# Patient Record
Sex: FEMALE | Race: WHITE | NOT HISPANIC OR LATINO
[De-identification: names, ages, dates, MRNs, and addresses within clinical notes are randomized per-mention and may not be internally consistent; named-entity substitution may affect disease eponyms.]

---

## 2021-03-31 ENCOUNTER — TRANSCRIPTION ENCOUNTER (OUTPATIENT)
Age: 86
End: 2021-03-31

## 2022-08-16 DIAGNOSIS — M19.071 PRIMARY OSTEOARTHRITIS, RIGHT ANKLE AND FOOT: ICD-10-CM

## 2022-08-16 DIAGNOSIS — Z86.39 PERSONAL HISTORY OF OTHER ENDOCRINE, NUTRITIONAL AND METABOLIC DISEASE: ICD-10-CM

## 2022-08-16 DIAGNOSIS — M19.072 PRIMARY OSTEOARTHRITIS, LEFT ANKLE AND FOOT: ICD-10-CM

## 2022-08-16 DIAGNOSIS — H35.30 UNSPECIFIED MACULAR DEGENERATION: ICD-10-CM

## 2022-08-16 DIAGNOSIS — L85.1 ACQUIRED KERATOSIS [KERATODERMA] PALMARIS ET PLANTARIS: ICD-10-CM

## 2022-08-16 DIAGNOSIS — Z86.79 PERSONAL HISTORY OF OTHER DISEASES OF THE CIRCULATORY SYSTEM: ICD-10-CM

## 2022-08-16 DIAGNOSIS — Z98.890 OTHER SPECIFIED POSTPROCEDURAL STATES: ICD-10-CM

## 2022-08-16 RX ORDER — FUROSEMIDE 20 MG/1
20 TABLET ORAL
Refills: 0 | Status: ACTIVE | COMMUNITY

## 2022-08-16 RX ORDER — METOPROLOL TARTRATE 50 MG/1
50 TABLET, FILM COATED ORAL
Refills: 0 | Status: ACTIVE | COMMUNITY

## 2022-08-16 RX ORDER — CHONDROITIN SULFATE A 250 MG
250 CAPSULE ORAL
Refills: 0 | Status: ACTIVE | COMMUNITY

## 2022-08-16 RX ORDER — MULTIVIT,IRON,MINERALS/LUTEIN
TABLET ORAL
Refills: 0 | Status: ACTIVE | COMMUNITY

## 2022-08-30 ENCOUNTER — APPOINTMENT (OUTPATIENT)
Dept: PODIATRY | Facility: CLINIC | Age: 87
End: 2022-08-30

## 2022-08-30 VITALS — HEIGHT: 61 IN | WEIGHT: 185 LBS | BODY MASS INDEX: 34.93 KG/M2

## 2022-08-30 DIAGNOSIS — B35.3 TINEA PEDIS: ICD-10-CM

## 2022-08-30 PROCEDURE — 99212 OFFICE O/P EST SF 10 MIN: CPT

## 2022-08-31 PROBLEM — B35.3 ATHLETE'S FOOT: Status: ACTIVE | Noted: 2022-08-16

## 2022-09-08 NOTE — HISTORY OF PRESENT ILLNESS
[Other: ____] : [unfilled] [Cane] : a cane [FreeTextEntry1] : Patient presents today because of athlete's foot on the right side. She has fungus nails and it spreads onto the skin. She has not been using the prescription cream I wrote for.\par

## 2022-09-08 NOTE — ASSESSMENT
[FreeTextEntry1] : \par Treatment:  She still has the Lotrisone. I want her to use it daily for one month after bathing on the entire right foot and on the nails and then once a week prophylactically thereafter. I want her to sleep without socks on. She could use antifungal powder. She will follow-up in the office for evaluation only as needed.

## 2022-09-08 NOTE — PHYSICAL EXAM
[0] : left foot dorsalis pedis 0 [Vibration Dec.] : diminished vibratory sensation at the level of the toes [Position Sense Dec.] : diminished position sense at the level of the toes [Diminished Throughout Right Foot] : diminished sensation with monofilament testing throughout right foot [Diminished Throughout Left Foot] : diminished sensation with monofilament testing throughout left foot [de-identified] : Arthritic hammertoes. Flatfoot. Bad case of athlete's foot on the right side.  [FreeTextEntry1] : She has athlete's foot of the right foot at the interspace 3 and 4, entire plantar aspect of the right foot. She has onychomycosis in all nails of the right foot 1, 2, 3, 4 and 5. They are end-stage thick, chalky, brittle with subungual debris and mycosis.

## 2022-11-17 ENCOUNTER — APPOINTMENT (OUTPATIENT)
Dept: PODIATRY | Facility: CLINIC | Age: 87
End: 2022-11-17

## 2022-11-17 PROCEDURE — 11720 DEBRIDE NAIL 1-5: CPT | Mod: Q8,59

## 2022-11-17 PROCEDURE — 11055 PARING/CUTG B9 HYPRKER LES 1: CPT | Mod: Q8

## 2022-11-22 NOTE — PHYSICAL EXAM
[0] : left foot dorsalis pedis 0 [Vibration Dec.] : diminished vibratory sensation at the level of the toes [Position Sense Dec.] : diminished position sense at the level of the toes [Diminished Throughout Right Foot] : diminished sensation with monofilament testing throughout right foot [Diminished Throughout Left Foot] : diminished sensation with monofilament testing throughout left foot [FreeTextEntry3] : Thin, atrophic skin. Absent hair growth. The patient has a class finding of Q8-Two Class B findings.  [de-identified] : Arthritic hammertoes. Flatfoot.  [FreeTextEntry1] : She has onychomycosis in all nails of the right foot 1, 2, 3, 4 and 5. They are end-stage thick, chalky, brittle with subungual debris and mycosis. The hallux nail is painful at the top, tip and tibial border, 2, 3 and 4 are painful at the tip and the 5th is painful at the top and tip. Right 3rd toe distal keratotic lesion, preulcerative.

## 2022-11-22 NOTE — ASSESSMENT
[FreeTextEntry1] : \par Treatment: I trimmed the right 3rd toe keratosis. I manually and mechanically debrided mycotic nails 1 through 5 on the right foot using a small straight nail splitter and rotary db. The nails were aggressively debrided and debulked to make them comfortable in shoe gear. I want her to soak with warm water and Epsom salt through the winter months and moisturize. Shoes are orthopedic and adequate.

## 2022-11-22 NOTE — HISTORY OF PRESENT ILLNESS
[Sneakers] : gray [FreeTextEntry1] : Patient presents today for routine foot care. She complains of painful onychomycotic nails that she cannot care for herself. She has a high risk foot due to poor circulation.

## 2023-01-26 ENCOUNTER — APPOINTMENT (OUTPATIENT)
Dept: PODIATRY | Facility: CLINIC | Age: 88
End: 2023-01-26
Payer: MEDICARE

## 2023-01-26 PROCEDURE — 11720 DEBRIDE NAIL 1-5: CPT

## 2023-02-01 NOTE — PHYSICAL EXAM
[0] : left foot dorsalis pedis 0 [Vibration Dec.] : diminished vibratory sensation at the level of the toes [Position Sense Dec.] : diminished position sense at the level of the toes [Diminished Throughout Right Foot] : diminished sensation with monofilament testing throughout right foot [Diminished Throughout Left Foot] : diminished sensation with monofilament testing throughout left foot [FreeTextEntry3] : Thin, atrophic skin. Absent hair growth. The patient has a class finding of Q8-Two Class B findings.  [FreeTextEntry1] : She has onychomycosis in all nails of the right foot 1, 2, 3, 4 and 5. They are end-stage thick, chalky, brittle with subungual debris and mycosis. The hallux nail is painful at the top, tip and tibial border, 2, 3 and 4 are painful at the tip and the 5th is painful at the top and tip. Right 3rd toe distal keratotic lesion, preulcerative. [de-identified] : Arthritic hammertoes. Flatfoot.

## 2023-02-01 NOTE — ASSESSMENT
[FreeTextEntry1] : \par Impression: Onychomycosis. Pain in toes.\par \par Treatment: I aggressively debrided and debulked painful mycotic nails using a small straight nail splitter and rotary db. I applied Naftin gel. I gave the patient samples to use at home. Follow-up for routine foot care.

## 2023-02-01 NOTE — HISTORY OF PRESENT ILLNESS
[Other: ____] : [unfilled] [FreeTextEntry1] : Patient is here for routine foot care. She complains of painful onychomycotic nails that she cannot care for herself. She has a high risk foot due to poor circulation. She is under the care of Dr. Campos who she last saw 1 month ago.\par \par

## 2023-04-14 ENCOUNTER — APPOINTMENT (OUTPATIENT)
Dept: PODIATRY | Facility: CLINIC | Age: 88
End: 2023-04-14
Payer: MEDICARE

## 2023-04-14 PROCEDURE — 11721 DEBRIDE NAIL 6 OR MORE: CPT

## 2023-04-20 NOTE — PHYSICAL EXAM
[0] : left foot dorsalis pedis 0 [Vibration Dec.] : diminished vibratory sensation at the level of the toes [Position Sense Dec.] : diminished position sense at the level of the toes [Diminished Throughout Right Foot] : diminished sensation with monofilament testing throughout right foot [Diminished Throughout Left Foot] : diminished sensation with monofilament testing throughout left foot [de-identified] : Arthritic hammertoe.  Flatfoot. [FreeTextEntry1] : Q8 - The patient has class findings of Q8 - Two Class B findings.

## 2023-04-20 NOTE — ASSESSMENT
[FreeTextEntry1] : Impression: Onychomycosis. Pain in toes.\par \par Treatment: I aggressively debrided and debulked painful mycotic nails using a small straight nail splitter and rotary db. I applied Naftin gel to treat the fungal infection.  I gave the patient samples to use at home. Follow-up for routine foot care. \par

## 2023-04-20 NOTE — HISTORY OF PRESENT ILLNESS
[Sneakers] : gray [Cane] : a cane [FreeTextEntry1] : Patient presents today for care of painful, mycotic nails that are getting worse.  She is unable to care for them due to the thickness of the nails.  She is at high risk due to poor circulation.  Patient is under the care of Dr. Campos who she last saw one week ago.

## 2023-06-28 ENCOUNTER — APPOINTMENT (OUTPATIENT)
Dept: PODIATRY | Facility: CLINIC | Age: 88
End: 2023-06-28
Payer: MEDICARE

## 2023-06-28 DIAGNOSIS — M79.673 PAIN IN UNSPECIFIED FOOT: ICD-10-CM

## 2023-06-28 DIAGNOSIS — R23.4 CHANGES IN SKIN TEXTURE: ICD-10-CM

## 2023-06-28 DIAGNOSIS — L85.3 XEROSIS CUTIS: ICD-10-CM

## 2023-06-28 PROCEDURE — 99212 OFFICE O/P EST SF 10 MIN: CPT

## 2023-06-28 RX ORDER — HYDROCORTISONE 1 %
12 CREAM (GRAM) TOPICAL
Qty: 225 | Refills: 2 | Status: ACTIVE | COMMUNITY
Start: 2023-06-28 | End: 1900-01-01

## 2023-06-30 PROBLEM — R23.4 SKIN FISSURE: Status: ACTIVE | Noted: 2023-06-29

## 2023-06-30 PROBLEM — L85.3 XEROSIS CUTIS: Status: ACTIVE | Noted: 2023-06-29

## 2023-06-30 PROBLEM — M79.673 PAIN, FOOT: Status: ACTIVE | Noted: 2023-06-29

## 2023-06-30 NOTE — HISTORY OF PRESENT ILLNESS
[Other: ____] : [unfilled] [FreeTextEntry1] : Patient presents today because of a bad case of athlete's foot but she has xerosis and dry skin.  She has fissures that are cracked open at the ball of the foot, right side worse than left.\par

## 2023-06-30 NOTE — ASSESSMENT
[FreeTextEntry1] : \par Impression: Xerosis. Skin fissure. Pain. \par \par Treatment: I debrided the fissures. I applied Neosporin. She could soak with warm water and white vinegar. I ePrescribed Ammonium Lactate for the patient to use daily for the next 3 months. Call the office with any worsening or issues.

## 2023-09-06 ENCOUNTER — APPOINTMENT (OUTPATIENT)
Dept: PODIATRY | Facility: CLINIC | Age: 88
End: 2023-09-06

## 2023-09-27 ENCOUNTER — APPOINTMENT (OUTPATIENT)
Dept: PODIATRY | Facility: CLINIC | Age: 88
End: 2023-09-27
Payer: MEDICARE

## 2023-09-27 PROCEDURE — 11720 DEBRIDE NAIL 1-5: CPT

## 2023-12-21 ENCOUNTER — APPOINTMENT (OUTPATIENT)
Dept: PODIATRY | Facility: CLINIC | Age: 88
End: 2023-12-21
Payer: MEDICARE

## 2023-12-21 PROCEDURE — 11720 DEBRIDE NAIL 1-5: CPT

## 2024-01-02 NOTE — PHYSICAL EXAM
[0] : left foot dorsalis pedis 0 [Vibration Dec.] : diminished vibratory sensation at the level of the toes [Position Sense Dec.] : diminished position sense at the level of the toes [Diminished Throughout Right Foot] : diminished sensation with monofilament testing throughout right foot [Diminished Throughout Left Foot] : diminished sensation with monofilament testing throughout left foot [FreeTextEntry3] : Thin, atrophic skin. Absent hair growth. The patient has a class finding of Q8-Two Class B findings.  [FreeTextEntry1] : She has onychomycosis in all nails of the right foot 1, 2, 3, 4 and 5. They are end-stage thick, chalky, brittle with subungual debris and mycosis. The are painful on the tops and tips. The hallux nail is also painful at the tibial border.

## 2024-01-02 NOTE — HISTORY OF PRESENT ILLNESS
[FreeTextEntry1] : Patient presents today with painful, end-stage, deformed onychomycotic nails that are getting much worse. They are painful 1, 2, 3, 4 and 5 on the right foot. She cannot care for them herself. She has a high-risk foot due to poor circulation. The patient's history and physical has been reviewed and verified with no changes.

## 2024-01-02 NOTE — ASSESSMENT
[FreeTextEntry1] : Impression: Onychomycosis.  Pain.    Treatment: I manually and mechanically debrided mycotic nails x5 using a small straight nail splitter and rotary db. The nails were aggressively debrided and debulked to make them comfortable in shoe gear. This patient has a high-risk foot due to poor circulation.  I applied Naftin gel and gave her samples. Discussed appropriate shoe gear. Follow-up as needed.

## 2024-02-29 ENCOUNTER — APPOINTMENT (OUTPATIENT)
Dept: PODIATRY | Facility: CLINIC | Age: 89
End: 2024-02-29
Payer: MEDICARE

## 2024-02-29 PROCEDURE — 11720 DEBRIDE NAIL 1-5: CPT

## 2024-03-04 NOTE — PHYSICAL EXAM
[0] : left foot dorsalis pedis 0 [Vibration Dec.] : diminished vibratory sensation at the level of the toes [Position Sense Dec.] : diminished position sense at the level of the toes [Diminished Throughout Right Foot] : diminished sensation with monofilament testing throughout right foot [Diminished Throughout Left Foot] : diminished sensation with monofilament testing throughout left foot [FreeTextEntry3] : Thin, atrophic skin. Absent hair growth. The patient has a class finding of Q8-Two Class B findings.  [de-identified] : Arthritic hammertoes. Flatfoot.  [FreeTextEntry1] : She has onychomycosis in all nails of the right foot 1, 2, 3, 4 and 5. They are end-stage thick, chalky, brittle with subungual debris and mycosis. The are painful on the tops and tips. The hallux nail is also painful at the tibial border.

## 2024-03-04 NOTE — HISTORY OF PRESENT ILLNESS
[FreeTextEntry1] : Patient presents today with painful worsening onychomycotic nails that she cannot care for herself. They cause her pain and difficulty in shoe gear. The patient's history and physical has been reviewed and verified with no changes.

## 2024-03-04 NOTE — ASSESSMENT
[FreeTextEntry1] : Impression: Onychomycosis.  Pain.    Treatment: I manually and mechanically debrided mycotic nails x5 using a small straight nail splitter and rotary db. The nails were aggressively debrided and debulked to make them comfortable in shoe gear. This patient has a high-risk foot due to poor circulation.  I applied Naftin gel.  Discussed appropriate shoe gear. Follow-up as needed.

## 2024-05-09 ENCOUNTER — APPOINTMENT (OUTPATIENT)
Dept: PODIATRY | Facility: CLINIC | Age: 89
End: 2024-05-09
Payer: MEDICARE

## 2024-05-09 DIAGNOSIS — M79.674 PAIN IN RIGHT TOE(S): ICD-10-CM

## 2024-05-09 DIAGNOSIS — I70.91 GENERALIZED ATHEROSCLEROSIS: ICD-10-CM

## 2024-05-09 DIAGNOSIS — B35.1 TINEA UNGUIUM: ICD-10-CM

## 2024-05-09 DIAGNOSIS — L85.1 ACQUIRED KERATOSIS [KERATODERMA] PALMARIS ET PLANTARIS: ICD-10-CM

## 2024-05-09 PROCEDURE — 11055 PARING/CUTG B9 HYPRKER LES 1: CPT | Mod: Q8

## 2024-05-09 PROCEDURE — 11720 DEBRIDE NAIL 1-5: CPT | Mod: Q8,59

## 2024-05-10 PROBLEM — M79.674 PAIN OF TOE OF RIGHT FOOT: Status: ACTIVE | Noted: 2022-11-21

## 2024-05-10 PROBLEM — L85.1 KERATODERMA, ACQUIRED: Status: ACTIVE | Noted: 2022-11-21

## 2024-05-10 PROBLEM — I70.91 GENERALIZED ATHEROSCLEROSIS: Status: ACTIVE | Noted: 2022-08-16

## 2024-05-10 PROBLEM — B35.1 ONYCHOMYCOSIS: Status: ACTIVE | Noted: 2022-08-16

## 2024-05-13 NOTE — PHYSICAL EXAM
[0] : left foot dorsalis pedis 0 [Vibration Dec.] : diminished vibratory sensation at the level of the toes [Position Sense Dec.] : diminished position sense at the level of the toes [Diminished Throughout Right Foot] : diminished sensation with monofilament testing throughout right foot [Diminished Throughout Left Foot] : diminished sensation with monofilament testing throughout left foot [FreeTextEntry3] : Thin, atrophic skin. Absent hair growth. The patient has a class finding of Q8-Two Class B findings.  [de-identified] : Arthritic hammertoes. Flatfoot.  [FreeTextEntry1] : She has onychomycosis in all nails of the right foot 1, 2, 3, 4 and 5. They are end-stage thick, chalky, brittle with subungual debris and mycosis. The are painful on the tops and tips. There is a painful right sub 5 IPK.

## 2024-05-13 NOTE — HISTORY OF PRESENT ILLNESS
[FreeTextEntry1] : Patient presents today complaining of painful, thickened onychomycotic nails that she is unable to care for herself. They are painful.  The patient's history and physical has been reviewed and verified with no changes.

## 2024-05-13 NOTE — ASSESSMENT
[FreeTextEntry1] : Impression: Onychomycosis.  Pain.  IPK. Generalized atherosclerosis.  Treatment: I manually and mechanically debrided mycotic nails x5 using a small straight nail splitter and rotary db. The nails were aggressively debrided and debulked to make them comfortable in shoe gear. This patient has a high-risk foot due to poor circulation. the IPK was trimmed and enucleated, after prepping with alcohol. I put an aperture pad on.  I applied Naftin gel to the nails and lotion to the remainder of the foot.  Discussed appropriate shoe gear. I encouraged her to walk for her circulation. Follow-up in 3 months.

## 2024-07-18 ENCOUNTER — APPOINTMENT (OUTPATIENT)
Dept: PODIATRY | Facility: CLINIC | Age: 89
End: 2024-07-18

## 2024-07-18 DIAGNOSIS — B35.1 TINEA UNGUIUM: ICD-10-CM

## 2024-07-18 DIAGNOSIS — M79.674 PAIN IN RIGHT TOE(S): ICD-10-CM

## 2024-07-18 PROCEDURE — 11720 DEBRIDE NAIL 1-5: CPT

## 2024-07-29 ENCOUNTER — APPOINTMENT (OUTPATIENT)
Dept: PULMONOLOGY | Facility: CLINIC | Age: 89
End: 2024-07-29
Payer: MEDICARE

## 2024-07-29 VITALS
TEMPERATURE: 97.5 F | SYSTOLIC BLOOD PRESSURE: 144 MMHG | RESPIRATION RATE: 18 BRPM | BODY MASS INDEX: 35.12 KG/M2 | DIASTOLIC BLOOD PRESSURE: 92 MMHG | WEIGHT: 186 LBS | HEART RATE: 82 BPM | OXYGEN SATURATION: 97 % | HEIGHT: 61 IN

## 2024-07-29 DIAGNOSIS — Z86.79 PERSONAL HISTORY OF OTHER DISEASES OF THE CIRCULATORY SYSTEM: ICD-10-CM

## 2024-07-29 DIAGNOSIS — R06.2 WHEEZING: ICD-10-CM

## 2024-07-29 PROCEDURE — 99204 OFFICE O/P NEW MOD 45 MIN: CPT

## 2024-07-29 RX ORDER — APIXABAN 5 MG/1
5 TABLET, FILM COATED ORAL
Refills: 0 | Status: ACTIVE | COMMUNITY

## 2024-07-29 RX ORDER — SPIRONOLACTONE 25 MG/1
25 TABLET ORAL
Refills: 0 | Status: ACTIVE | COMMUNITY

## 2024-07-29 RX ORDER — EMPAGLIFLOZIN 10 MG/1
10 TABLET, FILM COATED ORAL
Refills: 0 | Status: ACTIVE | COMMUNITY

## 2024-07-29 RX ORDER — METOPROLOL SUCCINATE 50 MG/1
50 TABLET, EXTENDED RELEASE ORAL
Refills: 0 | Status: ACTIVE | COMMUNITY

## 2024-07-29 NOTE — DISCUSSION/SUMMARY
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

## 2024-07-29 NOTE — PROCEDURE
[FreeTextEntry1] :  CXR 7/26/24: Mild pulmonary vascular congestion.  Blunting of the right costophrenic angle. slow gait, seems slightly more unstable on left

## 2024-07-29 NOTE — REVIEW OF SYSTEMS
[Nasal Congestion] : nasal congestion [Cough] : cough [Wheezing] : wheezing [SOB on Exertion] : sob on exertion [Edema] : edema [Fatigue] : no fatigue [Sinus Problems] : no sinus problems [Chest Tightness] : no chest tightness [Sputum] : no sputum [GERD] : no gerd [Myalgias] : no myalgias [Rash] : no rash [Anxiety] : no anxiety

## 2024-07-29 NOTE — HISTORY OF PRESENT ILLNESS
[Former] : former [TextBox_4] : She is an 88-year-old woman.  She has been noting shortness of breath for the past several months.  She was evaluated in urgent care and was given antibiotics.  Her shortness of breath continued.  There also has been some intermittent wheezing. She saw her primary care provider and was found to have atrial fibrillation.  After that she went to see cardiology and was diagnosed with CHF as well as the atrial fibrillation.  She was placed on a diuretic and she is feeling better.  She denied any prior history of pulmonary disease.  She stopped smoking over 20 years ago. [YearQuit] : 2000 [Difficulty Maintaining Sleep] : does not have difficulty maintaining sleep

## 2024-07-29 NOTE — PHYSICAL EXAM
[No Acute Distress] : no acute distress [Normal Appearance] : normal appearance [Normal S1, S2] : normal s1, s2 [Irregular rate/rhythm] : irregular rate/rhythm [No Resp Distress] : no resp distress [Clear to Auscultation Bilaterally] : clear to auscultation bilaterally [No HSM] : no hsm [Normal Gait] : normal gait [No Clubbing] : no clubbing [No Cyanosis] : no cyanosis [No Edema] : no edema [Normal Turgor] : normal turgor [No Focal Deficits] : no focal deficits [No Motor Deficits] : no motor deficits [Oriented x3] : oriented x3 [Normal Affect] : normal affect [TextBox_54] : The heart sounds were irregular

## 2024-08-28 ENCOUNTER — APPOINTMENT (OUTPATIENT)
Dept: PULMONOLOGY | Facility: CLINIC | Age: 89
End: 2024-08-28
Payer: MEDICARE

## 2024-08-28 VITALS
TEMPERATURE: 96.2 F | WEIGHT: 176 LBS | DIASTOLIC BLOOD PRESSURE: 62 MMHG | OXYGEN SATURATION: 98 % | HEART RATE: 54 BPM | SYSTOLIC BLOOD PRESSURE: 148 MMHG | BODY MASS INDEX: 33.26 KG/M2

## 2024-08-28 DIAGNOSIS — R06.2 WHEEZING: ICD-10-CM

## 2024-08-28 PROCEDURE — 99214 OFFICE O/P EST MOD 30 MIN: CPT

## 2024-08-28 NOTE — PROCEDURE
[FreeTextEntry1] : CXR 7/26/24: Mild pulmonary vascular congestion.  Blunting of the right costophrenic angle.

## 2024-08-28 NOTE — REVIEW OF SYSTEMS
[Nasal Congestion] : nasal congestion [SOB on Exertion] : sob on exertion [Edema] : edema [Fatigue] : no fatigue [Sinus Problems] : no sinus problems [Cough] : no cough [Chest Tightness] : no chest tightness [Wheezing] : no wheezing [Chest Discomfort] : no chest discomfort [Palpitations] : no palpitations [GERD] : no gerd [Myalgias] : no myalgias [Rash] : no rash [Anxiety] : no anxiety

## 2024-08-28 NOTE — DISCUSSION/SUMMARY
[FreeTextEntry1] : Impression Wheezing has resolved -Most likely this was due to "cardiac asthma" as noted on chest radiograph No prior history of pulmonary disease Former smoker stopped in 2000  Recommend Continue with her cardiac regimen Diuretics as per cardiology PFT when feasible I will see her again as needed

## 2024-08-28 NOTE — HISTORY OF PRESENT ILLNESS
[Former] : former [TextBox_4] : She is an 88-year-old woman.  She has been noting shortness of breath for the past several months.  She was evaluated in urgent care and was given antibiotics.  Her shortness of breath continued.  There also has been some intermittent wheezing. She saw her primary care provider and was found to have atrial fibrillation.  After that she went to see cardiology and was diagnosed with CHF as well as the atrial fibrillation.  She was placed on a diuretic and she is feeling better.  She denied any prior history of pulmonary disease.  She stopped smoking over 20 years ago.  Since her last visit her shortness of breath has improved.  She has not been wheezing.  Her diuretic was changed.  She does not know which 1 she is on now.  She had a stress test and was told everything was okay. [YearQuit] : 2000 [Difficulty Maintaining Sleep] : does not have difficulty maintaining sleep

## 2024-08-28 NOTE — PHYSICAL EXAM
[No Acute Distress] : no acute distress [Normal Appearance] : normal appearance [Normal S1, S2] : normal s1, s2 [No Resp Distress] : no resp distress [Clear to Auscultation Bilaterally] : clear to auscultation bilaterally [No HSM] : no hsm [Normal Gait] : normal gait [No Clubbing] : no clubbing [No Cyanosis] : no cyanosis [No Edema] : no edema [Normal Turgor] : normal turgor [No Focal Deficits] : no focal deficits [No Motor Deficits] : no motor deficits [Oriented x3] : oriented x3 [Normal Affect] : normal affect

## 2024-09-25 ENCOUNTER — APPOINTMENT (OUTPATIENT)
Dept: PODIATRY | Facility: CLINIC | Age: 89
End: 2024-09-25

## 2024-09-25 DIAGNOSIS — B35.1 TINEA UNGUIUM: ICD-10-CM

## 2024-09-25 DIAGNOSIS — M79.674 PAIN IN RIGHT TOE(S): ICD-10-CM

## 2024-09-25 PROCEDURE — 11720 DEBRIDE NAIL 1-5: CPT

## 2024-10-01 RX ORDER — ALENDRONATE SODIUM 35 MG/1
TABLET ORAL
Refills: 0 | Status: ACTIVE | COMMUNITY

## 2024-10-02 NOTE — PHYSICAL EXAM
[0] : left foot dorsalis pedis 0 [Vibration Dec.] : diminished vibratory sensation at the level of the toes [Position Sense Dec.] : diminished position sense at the level of the toes [Diminished Throughout Right Foot] : diminished sensation with monofilament testing throughout right foot [Diminished Throughout Left Foot] : diminished sensation with monofilament testing throughout left foot [FreeTextEntry3] : Thin, atrophic skin. Absent hair growth. The patient has a class finding of Q8-Two Class B findings.  [de-identified] : Arthritic hammertoes. Flatfoot.  [FreeTextEntry1] : She has onychomycosis in all nails of the right foot 1, 2, 3, 4 and 5. They are end-stage thick, chalky, brittle with subungual debris and mycosis. The are painful on the tops and tips.

## 2024-10-02 NOTE — HISTORY OF PRESENT ILLNESS
[FreeTextEntry1] : Patient presents today complaining of painful, thickened mycotic nails that she cannot care for herself. They cause her pain and difficulty in shoe gear. The patient's history and physical has been reviewed and verified with no changes.

## 2024-10-02 NOTE — PHYSICAL EXAM
[0] : left foot dorsalis pedis 0 [Vibration Dec.] : diminished vibratory sensation at the level of the toes [Position Sense Dec.] : diminished position sense at the level of the toes [Diminished Throughout Right Foot] : diminished sensation with monofilament testing throughout right foot [Diminished Throughout Left Foot] : diminished sensation with monofilament testing throughout left foot [FreeTextEntry3] : Thin, atrophic skin. Absent hair growth. The patient has a class finding of Q8-Two Class B findings.  [de-identified] : Arthritic hammertoes. Flatfoot.  [FreeTextEntry1] : She has onychomycosis in all nails of the right foot 1, 2, 3, 4 and 5. They are end-stage thick, chalky, brittle with subungual debris and mycosis. The are painful on the tops and tips.

## 2024-10-02 NOTE — ASSESSMENT
[FreeTextEntry1] : Impression: Onychomycosis.  Pain.   Treatment: I manually and mechanically debrided mycotic nails x5 using a small straight nail splitter and rotary db. The nails were aggressively debrided and debulked to make them comfortable in shoe gear. This patient has a high-risk foot due to poor circulation.   I applied Naftin gel to the nails and lotion to the remainder of the foot.  Discussed appropriate shoe gear. I encouraged her to walk for her circulation. Follow-up in 3 months.

## 2024-12-18 ENCOUNTER — APPOINTMENT (OUTPATIENT)
Dept: PODIATRY | Facility: CLINIC | Age: 88
End: 2024-12-18

## 2024-12-18 DIAGNOSIS — M79.673 PAIN IN UNSPECIFIED FOOT: ICD-10-CM

## 2024-12-18 DIAGNOSIS — B35.3 TINEA PEDIS: ICD-10-CM

## 2024-12-18 PROCEDURE — 99212 OFFICE O/P EST SF 10 MIN: CPT

## 2025-02-27 ENCOUNTER — APPOINTMENT (OUTPATIENT)
Dept: PODIATRY | Facility: CLINIC | Age: 89
End: 2025-02-27

## 2025-02-27 DIAGNOSIS — I70.91 GENERALIZED ATHEROSCLEROSIS: ICD-10-CM

## 2025-02-27 DIAGNOSIS — L85.1 ACQUIRED KERATOSIS [KERATODERMA] PALMARIS ET PLANTARIS: ICD-10-CM

## 2025-02-27 DIAGNOSIS — M79.674 PAIN IN RIGHT TOE(S): ICD-10-CM

## 2025-02-27 DIAGNOSIS — B35.1 TINEA UNGUIUM: ICD-10-CM

## 2025-02-27 PROCEDURE — 11720 DEBRIDE NAIL 1-5: CPT | Mod: Q8,59

## 2025-02-27 PROCEDURE — 11055 PARING/CUTG B9 HYPRKER LES 1: CPT | Mod: Q8

## 2025-05-09 ENCOUNTER — APPOINTMENT (OUTPATIENT)
Dept: PODIATRY | Facility: CLINIC | Age: 89
End: 2025-05-09

## 2025-05-09 DIAGNOSIS — B35.1 TINEA UNGUIUM: ICD-10-CM

## 2025-05-09 DIAGNOSIS — L85.1 ACQUIRED KERATOSIS [KERATODERMA] PALMARIS ET PLANTARIS: ICD-10-CM

## 2025-05-09 DIAGNOSIS — I70.91 GENERALIZED ATHEROSCLEROSIS: ICD-10-CM

## 2025-05-09 DIAGNOSIS — M79.674 PAIN IN RIGHT TOE(S): ICD-10-CM

## 2025-05-09 PROCEDURE — 11720 DEBRIDE NAIL 1-5: CPT | Mod: Q8,59

## 2025-05-09 PROCEDURE — 11055 PARING/CUTG B9 HYPRKER LES 1: CPT | Mod: Q8

## 2025-07-17 ENCOUNTER — NON-APPOINTMENT (OUTPATIENT)
Age: 89
End: 2025-07-17

## 2025-07-17 ENCOUNTER — APPOINTMENT (OUTPATIENT)
Dept: PODIATRY | Facility: CLINIC | Age: 89
End: 2025-07-17

## 2025-07-17 PROCEDURE — 11720 DEBRIDE NAIL 1-5: CPT | Mod: Q8,59

## 2025-07-17 PROCEDURE — 11055 PARING/CUTG B9 HYPRKER LES 1: CPT | Mod: Q8
